# Patient Record
(demographics unavailable — no encounter records)

---

## 2025-03-07 NOTE — PHYSICAL EXAM
[FreeTextEntry1] : GENERAL PHYSICAL EXAM: GEN: no distress, normal affect EYES: sclera white, conjunctiva clear, no nystagmus SKIN: warm, dry, no rash or lesion on exposed skin   NEUROLOGICAL EXAM: Mental Status Orientation: alert and oriented to person, place, time, and situation Language: clear and fluent, intact comprehension and repetition   Cranial Nerves II: visual fields full to confrontation III, IV, VI: PERRL, EOMI V, VII: facial sensation and movement intact and symmetric VIII: hearing intact IX, X: uvula midline, soft palate elevates normally XI: BL shoulder shrug intact XII: tongue midline   Motor Bilateral muscle strength 5/5 in UE and LE, proximally and distally Tone and bulk are normal in upper and lower limbs     Gait Normal stance, stride, and pivot turn

## 2025-03-07 NOTE — HISTORY OF PRESENT ILLNESS
[FreeTextEntry1] : 3/7/25: 32-year-old female presenting today for follow-up for migraine headaches.  Patient reports doing well with Ajovy, no injection site reactions or side effects however she does report the wear off time is sooner now, reports anywhere from 1 to 2 weeks before she is due for her next dose she is having breakthrough headaches.  These headaches can occur daily and she typically needs to take Tylenol for them.  No other neurological issues or complaints at this time.     9/6/24: Rosalba Penny is a 30YO female, presenting today for follow-up for migraine headaches. No migraines since starting the ajovy, doing great on it, no side effects. She reports no small headaches. She reports she came off the venlafaxine in July, she was not able to sleep while on it. Doesn't want to take anything else for anxiety at this time.  She has been doing allergy shots for environmental allergies, no issues with this.  No new issues or complaints.    5/2/24: 31-year-old woman with a history of chronic migraine without aura here for follow-up last time seen in August 2022. Longstanding history of headaches, now almost daily more common in the evenings moderate-severe intensity light and sound sensitivity occasionally nausea. For relief she takes Tylenol, Advil, which does not do much, she just has to lay down in a quiet room. No particular triggers, not associated with menstrual cycle. No episodes of sudden loss of vision, double vision, trouble speaking normal, no unilateral weakness or numbness of the face or extremities. She reports did not follow with this is because of at that time was pregnant and then she lost insurance but now she has a 2-year-old baby in good health still not sleeping through the night. But now has insurance and would like to restart treatment. In the past we have tried propranolol, nortriptyline, and lastly venlafaxine which we also using for anxiety which she said helped her anxiety but did not help her headaches. She was also tried on Emgality 120 mg which he said worked well. And would like to restart the medications. Other treatments were sumatriptan as needed, as well as rizatriptan 10 mg disintegrating tablet which worked better. Well-tolerated.

## 2025-03-07 NOTE — ASSESSMENT
[FreeTextEntry1] : 31-year-old woman with a history of intractable migraine headaches and anxiety disorder.  Patient reports anxiety is well-managed on her own at this time.  Headaches however are starting to breakthrough when Ajovy is due at least 1 week if not 2 weeks before next dose.  We discussed trialing taking the medication earlier to avoid breakthrough times however given that the breakthroughs began early this may not be enough.  We discussed adding additional medications to help prevent the migraines and patient is agreeable to this plan at this time.  No new neurological complaints at this time, exam remains unchanged.  Plan: - Continue rizatriptan for abortive treatment if needed. - Continue Ajovy subq monthly for prevention of migraines. -Start Topamax 25 mg once daily at bedtime for prevention of migraine headaches to be taken in addition to Ajovy, in hopes of preventing breakthrough headaches.  Should the patient need further relief may increase dose to 50 mg once daily if tolerating the 25 mg for at least 1 week.   - We discussed that if this medication works for her we could trial stopping the Ajovy altogether or can continue to use of conjunction.  Also discussed the option of trying a different injectable such as Aimovig however may have the same result as she did with Ajovy and Emgality. - Follow up in 3 months or sooner should the need arise.

## 2025-06-13 NOTE — ASSESSMENT
[FreeTextEntry1] : 32-year-old female presenting today for follow-up visits for migraine headaches.  Patient's headaches are well-controlled, last headache was 1 month ago, feels that Ajovy is now fully effective.  No adverse effects with Ajovy, she does report that she is not sleeping well at night however thinks this is likely due to stress, does not feel it is related to Ajovy.  We discussed that she could trial adding magnesium glycinate 200 to 400 mg nightly for added protection against headaches as well as help with sleeping.  Patient reports that she will try this.  Plan: - Continue Ajovy once monthly injections for prevention of migraine headaches. - Continue rizatriptan as needed for abortive migraine treatment. - Start magnesium glycinate 200 to 400 mg nightly for sleep. - Follow-up in 6 months or sooner should the need arise.

## 2025-06-13 NOTE — HISTORY OF PRESENT ILLNESS
[FreeTextEntry1] : 6/13/25: Patient returns today for follow-up visit for migraine headaches.  She tried adding the Topamax however she stopped getting her period while taking it, talk to her pharmacist who recommended she stop the medication and this helped.  Since being on the Ajovy for the last 3 months she has been doing well, has not had any headaches for a month now, last headache was about a month ago where she used rizatriptan with good relief.  No new complaints at this time patient feels like headaches are well-controlled on current regimen.   3/7/25: 32-year-old female presenting today for follow-up for migraine headaches.  Patient reports doing well with Ajovy, no injection site reactions or side effects however she does report the wear off time is sooner now, reports anywhere from 1 to 2 weeks before she is due for her next dose she is having breakthrough headaches.  These headaches can occur daily and she typically needs to take Tylenol for them.  No other neurological issues or complaints at this time.  9/6/24: Rosalba Penny is a 32YO female, presenting today for follow-up for migraine headaches. No migraines since starting the ajovy, doing great on it, no side effects. She reports no small headaches. She reports she came off the venlafaxine in July, she was not able to sleep while on it. Doesn't want to take anything else for anxiety at this time.  She has been doing allergy shots for environmental allergies, no issues with this.  No new issues or complaints.   5/2/24: 31-year-old woman with a history of chronic migraine without aura here for follow-up last time seen in August 2022. Longstanding history of headaches, now almost daily more common in the evenings moderate-severe intensity light and sound sensitivity occasionally nausea. For relief she takes Tylenol, Advil, which does not do much, she just has to lay down in a quiet room. No particular triggers, not associated with menstrual cycle. No episodes of sudden loss of vision, double vision, trouble speaking normal, no unilateral weakness or numbness of the face or extremities. She reports did not follow with this is because of at that time was pregnant and then she lost insurance but now she has a 2-year-old baby in good health still not sleeping through the night. But now has insurance and would like to restart treatment. In the past we have tried propranolol, nortriptyline, and lastly venlafaxine which we also using for anxiety which she said helped her anxiety but did not help her headaches. She was also tried on Emgality 120 mg which he said worked well. And would like to restart the medications. Other treatments were sumatriptan as needed, as well as rizatriptan 10 mg disintegrating tablet which worked better. Well-tolerated.